# Patient Record
Sex: FEMALE | Race: WHITE | NOT HISPANIC OR LATINO | Employment: OTHER | URBAN - METROPOLITAN AREA
[De-identification: names, ages, dates, MRNs, and addresses within clinical notes are randomized per-mention and may not be internally consistent; named-entity substitution may affect disease eponyms.]

---

## 2017-02-03 ENCOUNTER — OFFICE VISIT (OUTPATIENT)
Dept: NEUROLOGY | Facility: CLINIC | Age: 62
End: 2017-02-03

## 2017-02-03 VITALS
SYSTOLIC BLOOD PRESSURE: 126 MMHG | HEIGHT: 61 IN | DIASTOLIC BLOOD PRESSURE: 80 MMHG | WEIGHT: 200 LBS | BODY MASS INDEX: 37.76 KG/M2

## 2017-02-03 DIAGNOSIS — G44.1 OTHER VASCULAR HEADACHE: Primary | ICD-10-CM

## 2017-02-03 PROCEDURE — 99213 OFFICE O/P EST LOW 20 MIN: CPT | Performed by: PSYCHIATRY & NEUROLOGY

## 2017-02-03 NOTE — PROGRESS NOTES
"Mary Jo Perry    Subjective     CC: headache    History of Present Illness     Mary Jo Perry returns to clinic today with a history of headache and insomnia. She has a long history of headaches which have responded previously to gabapentin. She has noted left frontal pain associated with photosensitivity. Previously she has noted headaches about 6-7 times per month, though these have now become infrequent.     However, she is now having significant symptoms of anxiety, associated with periods of tremor and shortness of breath.    An MRI of the brain on 11/4/10 showed only left subcortical gliosis noted also on prior studies.      The following portions of the patient's history were reviewed and updated as appropriate: allergies, current medications, past family history, past medical history, past social history, past surgical history and problem list.    Review of Systems   Constitutional: Negative.    Cardiovascular: Negative.        Objective     Visit Vitals   • /80   • Ht 61\" (154.9 cm)   • Wt 200 lb (90.7 kg)   • BMI 37.79 kg/m2        Neurologic Exam     Mental Status   Oriented to person, place, and time.   Registration: recalls 3 of 3 objects. Recall at 5 minutes: recalls 3 of 3 objects.   Attention: normal.   Speech: speech is normal   Level of consciousness: alert    Cranial Nerves   Cranial nerves II through XII intact.     Motor Exam   Muscle bulk: normal  Overall muscle tone: normal    Strength   Strength 5/5 throughout.     Sensory Exam   Light touch normal.     Gait, Coordination, and Reflexes     Coordination   Finger to nose coordination: normal        Assessment/Plan   Mary Jo was seen today for memory loss.    Diagnoses and all orders for this visit:    Other vascular headache        Mary Jo Perry returns to clinic today with a history of headache. Her headaches are not currently problematic and so I did not recommend any changes in her treatment regimen for this. She does " describe significant anxiety, and requested a prescription for Klonopin. This might be reasonable, but is outside of my area of expertise. I will defer this to her PCP. I did offer, however, to refer her to Behavioral Health, which was declined for now.      I spent 20 minutes with the patient. I   spent 70 percent of this time counseling and discussing diagnosis, evaluation and management.        Nikita Terrell MD

## 2017-02-06 RX ORDER — GABAPENTIN 400 MG/1
CAPSULE ORAL
Qty: 180 CAPSULE | Refills: 10 | Status: SHIPPED | OUTPATIENT
Start: 2017-02-06 | End: 2018-01-30 | Stop reason: SDUPTHER

## 2017-03-24 ENCOUNTER — CONSULT (OUTPATIENT)
Dept: CARDIOLOGY | Facility: CLINIC | Age: 62
End: 2017-03-24

## 2017-03-24 VITALS
HEART RATE: 74 BPM | BODY MASS INDEX: 38.53 KG/M2 | HEIGHT: 62 IN | DIASTOLIC BLOOD PRESSURE: 83 MMHG | SYSTOLIC BLOOD PRESSURE: 112 MMHG | WEIGHT: 209.4 LBS

## 2017-03-24 DIAGNOSIS — E78.5 DYSLIPIDEMIA: ICD-10-CM

## 2017-03-24 DIAGNOSIS — I10 ESSENTIAL HYPERTENSION: ICD-10-CM

## 2017-03-24 DIAGNOSIS — E66.9 OBESITY (BMI 35.0-39.9 WITHOUT COMORBIDITY): ICD-10-CM

## 2017-03-24 DIAGNOSIS — R07.2 PRECORDIAL PAIN: Primary | ICD-10-CM

## 2017-03-24 PROCEDURE — 99243 OFF/OP CNSLTJ NEW/EST LOW 30: CPT | Performed by: INTERNAL MEDICINE

## 2017-03-24 PROCEDURE — 93000 ELECTROCARDIOGRAM COMPLETE: CPT | Performed by: INTERNAL MEDICINE

## 2017-03-24 RX ORDER — ASPIRIN 325 MG
325 TABLET ORAL DAILY
COMMUNITY
End: 2019-10-09 | Stop reason: DRUGHIGH

## 2017-03-24 RX ORDER — CARVEDILOL 3.12 MG/1
3.12 TABLET ORAL 2 TIMES DAILY WITH MEALS
Qty: 60 TABLET | Refills: 6 | Status: SHIPPED | OUTPATIENT
Start: 2017-03-24 | End: 2018-02-09

## 2017-03-24 RX ORDER — NAPROXEN 500 MG/1
500 TABLET ORAL 2 TIMES DAILY WITH MEALS
Qty: 60 TABLET | Refills: 0 | Status: SHIPPED | OUTPATIENT
Start: 2017-03-24 | End: 2017-05-05 | Stop reason: SDUPTHER

## 2017-03-24 RX ORDER — LISINOPRIL 10 MG/1
10 TABLET ORAL DAILY
Qty: 30 TABLET | Refills: 6 | Status: SHIPPED | OUTPATIENT
Start: 2017-03-24 | End: 2018-02-09

## 2017-03-24 NOTE — PROGRESS NOTES
Mary Jo Perry is a 62 y.o. female.    Patient Active Problem List   Diagnosis   • Headache   • Insomnia        Chief Complaint: Chest Pain and Dizziness      PROBLEM LIST:  1. Chest pain  a. Echo 8/25/2016: EF 66%.  No valvular disease noted. Diastolic dysfunction.  2. Leg Pain  a. Lower extremity Doppler 8/25/16: No significant hemodynamic stenosis.  3. Vascular headaches  a. Being followed by neurology.  4. Hypertension  5. Hyperlipidemia  6. DM 2  7. Obesity  8. Surgical Hx:  a. Hysterectomy    History of Present Illness   Patient is a pleasant 62-year-old female of [place] descent with multiple cardiovascular risk factors.  She is referred for evaluation of chest pain.  History was obtained through the help of an . Her main concern is a midsternal chest pain which she describes as deep tightness.  She notes associated shortness of breath when she coughs and exerts.  The pain has been going on for about half a year now.  Denies leg swelling, sleep disturbances and syncope.  Does not smoke cigarettes nor does she consume alcohol.  Currently on disability.  Negative family history of cardiac disease.  She has also been treated for vascular headaches, lifestyle is sedentary and she does report significant shortness of breath and chest tightness on walking up the stairs or with other exertional activities.    The following portions of the patient's history were reviewed and updated as appropriate: allergies, current medications, past family history, past medical history, past social history, past surgical history and problem list.    Social history:  She is , has 6 children, denies cigarette smoking alcohol or substance abuse.  She is on disability.  Family History:  Negative for significant cardiac disease.    Allergies   Allergen Reactions   • Asa [Aspirin]    • Ibuprofen    • Penicillins          Current Outpatient Prescriptions:   •  aspirin 325 MG tablet, Take 325 mg by mouth Daily.,  "Disp: , Rfl:   •  estrogens, conjugated, (PREMARIN) 0.625 MG tablet, Take 1 tablet by mouth daily., Disp: , Rfl:   •  gabapentin (NEURONTIN) 400 MG capsule, TAKE TWO CAPSULES BY MOUTH THREE TIMES A DAY, Disp: 180 capsule, Rfl: 10  •  gabapentin (NEURONTIN) 800 MG tablet, Take 1 tablet by mouth 3 (three) times a day., Disp: , Rfl:   •  metFORMIN (GLUCOPHAGE) 1000 MG tablet, Take 1 tablet by mouth 2 (two) times a day. With meals, Disp: , Rfl:   •  traZODone (DESYREL) 100 MG tablet, Take 1 tablet by mouth every night., Disp: , Rfl:   •  carvedilol (COREG) 3.125 MG tablet, Take 1 tablet by mouth 2 (Two) Times a Day With Meals., Disp: 60 tablet, Rfl: 6  •  lisinopril (PRINIVIL,ZESTRIL) 10 MG tablet, Take 1 tablet by mouth Daily., Disp: 30 tablet, Rfl: 6  •  naproxen (EC-NAPROSYN) 500 MG EC tablet, Take 1 tablet by mouth 2 (Two) Times a Day With Meals., Disp: 60 tablet, Rfl: 0    Review of Systems   Constitution: Negative for chills, fever, weight gain and weight loss.   Cardiovascular: Positive for chest pain, dyspnea on exertion and palpitations. Negative for claudication, irregular heartbeat, leg swelling, orthopnea, paroxysmal nocturnal dyspnea and syncope.        No dizziness   Respiratory: Positive for shortness of breath.    Gastrointestinal: Negative for abdominal pain, constipation, diarrhea, nausea and vomiting.   Genitourinary:        No urinary symptoms   Neurological:        No symptoms of stroke.   All other systems reviewed and are negative.      Objective      Blood pressure 112/83, pulse 74, height 62\" (157.5 cm), weight 209 lb 6.4 oz (95 kg).    Physical Exam   Constitutional: She appears well-developed and well-nourished.   HENT:   HEENT exam unremarkable.   Neck: Neck supple. No JVD present.   No carotid bruits.   Cardiovascular: Normal rate, regular rhythm and normal heart sounds.    No murmur heard.  2 plus symmetric pulses.   Pulmonary/Chest: Breath sounds normal. She exhibits no tenderness. "   Abdominal:   Abdomen benign.   Musculoskeletal: She exhibits tenderness (Chest). She exhibits no edema.   Neurological:   Neurological exam unremarkable.   Vitals reviewed.        ECG 12 Lead  Date/Time: 3/24/2017 10:19 AM  Performed by: DEZ AMES  Authorized by: DEZ AMES   Comparison: not compared with previous ECG   Previous ECG: no previous ECG available  Rhythm: sinus rhythm  Clinical impression: normal ECG         labs from July 19, 2016 showed  normal CBC, blood sugar 186 rest of the CMP is normal.  Total cholesterol 187 triglycerides 185 HDL 53 LDL 97.  TSH 2.3.    Assessment:     Diagnosis Plan   1. Precordial pain  Stress Test With Pet Myocardial Perfusion   2. Essential hypertension     3. Dyslipidemia     4. Obesity (BMI 35.0-39.9 without comorbidity)        Plan:  1. There are 2 components to her chest pain, she apparently has some costochondritis with chest wall tenderness and also reports a deep tightness with dyspnea with or without exertion which appears to be an angina current in the setting of multiple cardiovascular risk factors.  2. For now we will discontinue amlodipine and start her on lisinopril 10 mg daily as she is diabetic.  3. Add Coreg 3.125 mg twice a day, continue aspirin and rest of the current medications.  4. Naprosyn 500 twice a day for 2-3 weeks for management of musculoskeletal component of chest pain.  5. Cardiac PET scan to evaluate for ischemic heart disease.  6. Diet and exercise recommended.  7. Management of risk factors specially diabetes and dyslipidemia per PCP.  8. Follow-up in one month.  Sooner when necessary.  9. Thank you for allowing us to participate in the care of your patient.    Dez Ames MD [facility]    Please note that portions of this note may have been completed with a voice recognition program. Efforts were made to edit the dictations, but occasionally words are mistranscribed.

## 2017-03-31 ENCOUNTER — APPOINTMENT (OUTPATIENT)
Dept: CARDIOLOGY | Facility: HOSPITAL | Age: 62
End: 2017-03-31
Attending: INTERNAL MEDICINE

## 2017-04-07 ENCOUNTER — HOSPITAL ENCOUNTER (OUTPATIENT)
Dept: CARDIOLOGY | Facility: HOSPITAL | Age: 62
Setting detail: HOSPITAL OUTPATIENT SURGERY
Discharge: HOME | End: 2017-04-07
Attending: INTERNAL MEDICINE | Admitting: INTERNAL MEDICINE

## 2017-04-07 VITALS — HEIGHT: 62 IN | BODY MASS INDEX: 38.46 KG/M2 | WEIGHT: 209 LBS

## 2017-04-07 DIAGNOSIS — R07.2 PRECORDIAL PAIN: ICD-10-CM

## 2017-04-07 PROCEDURE — 93018 CV STRESS TEST I&R ONLY: CPT | Performed by: INTERNAL MEDICINE

## 2017-04-07 PROCEDURE — 25010000002 REGADENOSON 0.4 MG/5ML SOLUTION: Performed by: INTERNAL MEDICINE

## 2017-04-07 PROCEDURE — 78492 MYOCRD IMG PET MLT RST&STRS: CPT | Performed by: INTERNAL MEDICINE

## 2017-04-07 PROCEDURE — A9555 RB82 RUBIDIUM: HCPCS | Performed by: INTERNAL MEDICINE

## 2017-04-07 PROCEDURE — 78492 MYOCRD IMG PET MLT RST&STRS: CPT

## 2017-04-07 PROCEDURE — 93017 CV STRESS TEST TRACING ONLY: CPT

## 2017-04-07 PROCEDURE — 0 RUBIDIUM CHLORIDE: Performed by: INTERNAL MEDICINE

## 2017-04-07 RX ADMIN — RUBIDIUM CHLORIDE RB-82 1 DOSE: 150 INJECTION, SOLUTION INTRAVENOUS at 13:25

## 2017-04-07 RX ADMIN — RUBIDIUM CHLORIDE RB-82 1 DOSE: 150 INJECTION, SOLUTION INTRAVENOUS at 13:15

## 2017-04-07 RX ADMIN — REGADENOSON 0.4 MG: 0.08 INJECTION, SOLUTION INTRAVENOUS at 13:29

## 2017-04-10 LAB
BH CV NUCLEAR PRIOR STUDY: 2
LV EF NUC BP: 71 %

## 2017-05-03 RX ORDER — NAPROXEN 500 MG/1
TABLET ORAL
Qty: 60 TABLET | Refills: 0 | Status: CANCELLED | OUTPATIENT
Start: 2017-05-03

## 2017-05-05 RX ORDER — NAPROXEN 500 MG/1
TABLET ORAL
Qty: 60 TABLET | Refills: 0 | Status: SHIPPED | OUTPATIENT
Start: 2017-05-05 | End: 2017-06-04 | Stop reason: SDUPTHER

## 2017-06-06 RX ORDER — NAPROXEN 500 MG/1
TABLET ORAL
Qty: 60 TABLET | Refills: 0 | Status: SHIPPED | OUTPATIENT
Start: 2017-06-06 | End: 2017-07-10 | Stop reason: SDUPTHER

## 2017-06-14 ENCOUNTER — TRANSCRIBE ORDERS (OUTPATIENT)
Dept: ADMINISTRATIVE | Facility: HOSPITAL | Age: 62
End: 2017-06-14

## 2017-06-14 DIAGNOSIS — Z12.31 VISIT FOR SCREENING MAMMOGRAM: Primary | ICD-10-CM

## 2017-07-06 ENCOUNTER — HOSPITAL ENCOUNTER (OUTPATIENT)
Dept: MAMMOGRAPHY | Facility: HOSPITAL | Age: 62
Discharge: HOME | End: 2017-07-06
Admitting: FAMILY MEDICINE

## 2017-07-06 ENCOUNTER — APPOINTMENT (OUTPATIENT)
Dept: MAMMOGRAPHY | Facility: HOSPITAL | Age: 62
End: 2017-07-06

## 2017-07-06 ENCOUNTER — APPOINTMENT (OUTPATIENT)
Dept: OTHER | Facility: HOSPITAL | Age: 62
End: 2017-07-06

## 2017-07-06 DIAGNOSIS — Z12.31 VISIT FOR SCREENING MAMMOGRAM: ICD-10-CM

## 2017-07-06 DIAGNOSIS — Z92.89 H/O MAMMOGRAM: ICD-10-CM

## 2017-07-06 PROCEDURE — 77063 BREAST TOMOSYNTHESIS BI: CPT

## 2017-07-06 PROCEDURE — 77063 BREAST TOMOSYNTHESIS BI: CPT | Performed by: RADIOLOGY

## 2017-07-06 PROCEDURE — 77067 SCR MAMMO BI INCL CAD: CPT | Performed by: RADIOLOGY

## 2017-07-06 PROCEDURE — G0202 SCR MAMMO BI INCL CAD: HCPCS

## 2017-07-10 RX ORDER — NAPROXEN 500 MG/1
TABLET ORAL
Qty: 180 TABLET | Refills: 1 | Status: SHIPPED | OUTPATIENT
Start: 2017-07-10 | End: 2018-02-09

## 2018-01-30 RX ORDER — GABAPENTIN 400 MG/1
CAPSULE ORAL
Qty: 180 CAPSULE | Refills: 4 | Status: SHIPPED | OUTPATIENT
Start: 2018-01-30 | End: 2018-03-04 | Stop reason: SDUPTHER

## 2018-02-09 ENCOUNTER — OFFICE VISIT (OUTPATIENT)
Dept: NEUROLOGY | Facility: CLINIC | Age: 63
End: 2018-02-09

## 2018-02-09 VITALS
DIASTOLIC BLOOD PRESSURE: 82 MMHG | SYSTOLIC BLOOD PRESSURE: 125 MMHG | HEIGHT: 62 IN | WEIGHT: 170 LBS | BODY MASS INDEX: 31.28 KG/M2

## 2018-02-09 DIAGNOSIS — R41.3 MEMORY LOSS: Primary | ICD-10-CM

## 2018-02-09 PROCEDURE — 99214 OFFICE O/P EST MOD 30 MIN: CPT | Performed by: PSYCHIATRY & NEUROLOGY

## 2018-02-09 RX ORDER — BUSPIRONE HYDROCHLORIDE 15 MG/1
TABLET ORAL
COMMUNITY
Start: 2018-02-02

## 2018-02-09 RX ORDER — ATORVASTATIN CALCIUM 80 MG/1
80 TABLET, FILM COATED ORAL DAILY
COMMUNITY

## 2018-02-09 RX ORDER — AMLODIPINE BESYLATE 5 MG/1
TABLET ORAL
COMMUNITY
Start: 2018-01-30

## 2018-02-09 NOTE — PROGRESS NOTES
"Mary Jo Perry    Subjective     CC: memory loss    History of Present Illness   Mary Jo Perry returns to clinic today for evaluation of memory impairment. She has noted symptoms since approximately October, 2017. She has noted times where she confused her daughters names. She also has periods when she feels \"cloudy headed\" and has had anxiety or panic attacks. It is also noted that she recently lost her  to cancer. She is currently residing at home independently, though her daughters check on her frequently.     She has a history of anxiety, though has not worked with behavioral health. She does follow with Dr. Stewart.. She was prescribed Ativan at [facility] ED in [facility], which she thinks has been helpful.     I have reviewed and confirmed the past family, social and medical history as accurate on 2/9/18.     Review of Systems   Constitutional: Negative.        Objective     /82  Ht 157.5 cm (62\")  Wt 77.1 kg (170 lb)  LMP  (LMP Unknown)  BMI 31.09 kg/m2     Neurologic Exam     Mental Status   Oriented to person, place, and time.   Registration: recalls 3 of 3 objects. Recall at 5 minutes: recalls 2 of 3 objects.   Attention: normal.   Speech: speech is normal   Level of consciousness: alert  Able to name object. Able to read. Able to repeat. Able to write. Normal comprehension.       MMSE=27      Assessment/Plan   Mary Jo was seen today for memory loss.    Diagnoses and all orders for this visit:    Memory loss  -     Ambulatory Referral to Behavioral Health  -     CBC & Differential; Future  -     Comprehensive Metabolic Panel  -     CT Head Without Contrast  -     Folate  -     Vitamin B12  -     TSH    Other orders  -     sertraline (ZOLOFT) 50 MG tablet; Take 2 tablets by mouth Daily.          Mary Jo Peryr returns to clinic today with a history of memory impairment. Her examination is largely reassuring, and I suspect a pseudodementia related to underlying anxiety and " depression, which I discussed. I will check screening bloodwork and a head CT to rule out other factors. She has agreed to referral to behavioral health as well, which may be most important. I was not comfortable refilling her Ativan, though this may well be a good medication for her. However, I will increase her sertraline to 100 mg daily while we await evaluation through behavioral health.      Nikita Terrell MD

## 2018-02-12 RX ORDER — SERTRALINE HYDROCHLORIDE 100 MG/1
100 TABLET, FILM COATED ORAL DAILY
Qty: 30 TABLET | Refills: 5 | Status: SHIPPED | OUTPATIENT
Start: 2018-02-12 | End: 2019-10-09

## 2018-02-15 ENCOUNTER — HOSPITAL ENCOUNTER (OUTPATIENT)
Dept: CT IMAGING | Facility: HOSPITAL | Age: 63
Discharge: HOME | End: 2018-02-15
Attending: PSYCHIATRY & NEUROLOGY | Admitting: PSYCHIATRY & NEUROLOGY

## 2018-02-15 PROCEDURE — 70450 CT HEAD/BRAIN W/O DYE: CPT

## 2018-03-06 RX ORDER — GABAPENTIN 400 MG/1
CAPSULE ORAL
Qty: 180 CAPSULE | Refills: 0 | Status: SHIPPED | OUTPATIENT
Start: 2018-03-06 | End: 2018-04-06 | Stop reason: SDUPTHER

## 2018-04-02 RX ORDER — CARVEDILOL 3.12 MG/1
TABLET ORAL
Qty: 60 TABLET | Refills: 5 | OUTPATIENT
Start: 2018-04-02

## 2018-04-06 RX ORDER — GABAPENTIN 400 MG/1
CAPSULE ORAL
Qty: 180 CAPSULE | Refills: 0 | Status: SHIPPED | OUTPATIENT
Start: 2018-04-06 | End: 2018-05-03 | Stop reason: SDUPTHER

## 2018-05-02 RX ORDER — GABAPENTIN 400 MG/1
CAPSULE ORAL
Qty: 180 CAPSULE | Refills: 0 | Status: CANCELLED | OUTPATIENT
Start: 2018-05-02

## 2018-05-03 RX ORDER — GABAPENTIN 400 MG/1
800 CAPSULE ORAL 3 TIMES DAILY
Qty: 180 CAPSULE | Refills: 0 | Status: SHIPPED | OUTPATIENT
Start: 2018-05-03 | End: 2018-06-08 | Stop reason: SDUPTHER

## 2018-06-08 RX ORDER — GABAPENTIN 400 MG/1
CAPSULE ORAL
Qty: 180 CAPSULE | Refills: 0 | Status: SHIPPED | OUTPATIENT
Start: 2018-06-08 | End: 2018-07-10 | Stop reason: SDUPTHER

## 2018-07-09 RX ORDER — GABAPENTIN 400 MG/1
CAPSULE ORAL
Qty: 180 CAPSULE | Refills: 0 | Status: CANCELLED | OUTPATIENT
Start: 2018-07-09

## 2018-07-10 RX ORDER — GABAPENTIN 400 MG/1
800 CAPSULE ORAL 3 TIMES DAILY
Qty: 180 CAPSULE | Refills: 0 | Status: SHIPPED | OUTPATIENT
Start: 2018-07-10 | End: 2018-09-05 | Stop reason: SDUPTHER

## 2018-09-07 RX ORDER — GABAPENTIN 400 MG/1
CAPSULE ORAL
Qty: 180 CAPSULE | Refills: 0 | Status: SHIPPED | OUTPATIENT
Start: 2018-09-07 | End: 2019-10-02 | Stop reason: SDUPTHER

## 2018-09-17 ENCOUNTER — TELEPHONE (OUTPATIENT)
Dept: NEUROLOGY | Facility: CLINIC | Age: 63
End: 2018-09-17

## 2018-09-17 NOTE — TELEPHONE ENCOUNTER
Daughter Yelitza [phone] called regarding patient's gabapentin.  She stated that the gabapentin is not covered by insurance.  If the insurance is not covering gabapentin, is there something else she can take.

## 2018-09-18 NOTE — TELEPHONE ENCOUNTER
Called [facility] and spoke with pharmacist, Kristine. Ran Rx through and denial was due to the amount of pills. Her insurance will not cover more than 4 tablets a day. With Toya Grady's permission, changed the dose from  mg (Take 2 tablets 3xs a day) to  mg( Take 1 tablet 3xs daily). Pharmacist ran this through and Medicaid will cover it!

## 2018-09-18 NOTE — TELEPHONE ENCOUNTER
LVM on Yelitza's phone letting her know everything is worked out with her mother's new prescription. Cb# given.

## 2018-09-18 NOTE — TELEPHONE ENCOUNTER
Spoke with pt's daughter, Yelitza. States they were thinking an alternative medication needed to be prescribed for her mother since the Insurance would not cover her Gabapentin. Informed her that we can actually do a prior auth to try and get it covered by her insurance, but will call the pharmacy first and see why it is being denied.

## 2019-10-02 ENCOUNTER — TELEPHONE (OUTPATIENT)
Dept: NEUROLOGY | Facility: CLINIC | Age: 64
End: 2019-10-02

## 2019-10-02 RX ORDER — GABAPENTIN 400 MG/1
800 CAPSULE ORAL 3 TIMES DAILY
Qty: 180 CAPSULE | Refills: 0 | Status: SHIPPED | OUTPATIENT
Start: 2019-10-02 | End: 2019-11-02 | Stop reason: SDUPTHER

## 2019-10-02 NOTE — TELEPHONE ENCOUNTER
----- Message from Glenda Rosario sent at 10/2/2019 10:10 AM EDT -----  Contact: [phone]  Dr. Terrell,    Pt called requesting a refill on her Rx for gabapentin (NEURONTIN) 400 MG capsule .

## 2019-10-02 NOTE — TELEPHONE ENCOUNTER
Called and informed pt that follow up appointment is needing to be scheduled to send in refill Rx. Pt stated understanding and transferred pt to scheduling and pt scheduled appt 10/8. Please send Rx. Thanks.

## 2019-10-02 NOTE — TELEPHONE ENCOUNTER
----- Message from Glenda Rosario sent at 10/2/2019 10:10 AM EDT -----  Contact: [phone]  Dr. Terrell,    Pt called requesting a refill on her Rx for gabapentin (NEURONTIN) 400 MG capsule .

## 2019-10-03 NOTE — TELEPHONE ENCOUNTER
Called pt informing that refill Rx has been sent into pharmacy and to call office if have any questions. Pt stated understanding. Thanks.

## 2019-10-09 ENCOUNTER — APPOINTMENT (OUTPATIENT)
Dept: LAB | Facility: HOSPITAL | Age: 64
End: 2019-10-09

## 2019-10-09 ENCOUNTER — OFFICE VISIT (OUTPATIENT)
Dept: NEUROLOGY | Facility: CLINIC | Age: 64
End: 2019-10-09

## 2019-10-09 VITALS
SYSTOLIC BLOOD PRESSURE: 118 MMHG | HEIGHT: 62 IN | WEIGHT: 170 LBS | BODY MASS INDEX: 31.28 KG/M2 | DIASTOLIC BLOOD PRESSURE: 72 MMHG

## 2019-10-09 DIAGNOSIS — R41.3 MEMORY LOSS: Primary | ICD-10-CM

## 2019-10-09 PROCEDURE — 85025 COMPLETE CBC W/AUTO DIFF WBC: CPT | Performed by: PHYSICIAN ASSISTANT

## 2019-10-09 PROCEDURE — 82607 VITAMIN B-12: CPT | Performed by: PHYSICIAN ASSISTANT

## 2019-10-09 PROCEDURE — 84443 ASSAY THYROID STIM HORMONE: CPT | Performed by: PHYSICIAN ASSISTANT

## 2019-10-09 PROCEDURE — 82746 ASSAY OF FOLIC ACID SERUM: CPT | Performed by: PHYSICIAN ASSISTANT

## 2019-10-09 PROCEDURE — 99215 OFFICE O/P EST HI 40 MIN: CPT | Performed by: PHYSICIAN ASSISTANT

## 2019-10-09 PROCEDURE — 36415 COLL VENOUS BLD VENIPUNCTURE: CPT | Performed by: PHYSICIAN ASSISTANT

## 2019-10-09 PROCEDURE — 80053 COMPREHEN METABOLIC PANEL: CPT | Performed by: PHYSICIAN ASSISTANT

## 2019-10-09 RX ORDER — SERTRALINE HYDROCHLORIDE 100 MG/1
150 TABLET, FILM COATED ORAL DAILY
Qty: 45 TABLET | Refills: 11 | Status: SHIPPED | OUTPATIENT
Start: 2019-10-09 | End: 2020-10-08

## 2019-10-09 RX ORDER — ASPIRIN 81 MG/1
TABLET, COATED ORAL
COMMUNITY
Start: 2019-09-26

## 2019-10-09 RX ORDER — METOPROLOL TARTRATE 100 MG/1
TABLET ORAL
COMMUNITY
Start: 2019-09-28 | End: 2020-11-02

## 2019-10-09 RX ORDER — IPRATROPIUM BROMIDE AND ALBUTEROL SULFATE 2.5; .5 MG/3ML; MG/3ML
SOLUTION RESPIRATORY (INHALATION)
COMMUNITY
Start: 2019-09-26 | End: 2020-11-02

## 2019-10-09 RX ORDER — MIRTAZAPINE 45 MG/1
TABLET, FILM COATED ORAL
COMMUNITY
Start: 2019-10-06

## 2019-10-09 RX ORDER — LIDOCAINE 50 MG/G
OINTMENT TOPICAL
COMMUNITY
Start: 2019-09-26

## 2019-10-09 RX ORDER — OMEPRAZOLE 20 MG/1
CAPSULE, DELAYED RELEASE ORAL
COMMUNITY
Start: 2019-09-11 | End: 2020-11-02

## 2019-10-09 NOTE — PROGRESS NOTES
"Subjective     Chief Complaint: memory loss      History of Present Illness   Mary Jo Perry is a 64 y.o. female who returns to clinic today for evaluation of memory impairment. She has noted symptoms since approximately October, 2017. She has noted times where she confused her daughters names. She also has periods when she feels \"cloudy headed\" and has had anxiety or panic attacks. It is also noted that she recently lost her  to cancer. She is currently residing at home independently, though her daughters check on her frequently.      She has a history of anxiety, though has not worked with behavioral health. She does follow with Dr. Stewart.. She was prescribed Ativan at [facility] ED in [facility], which she thinks has been helpful.     Today: Since her last visit in 218, she and her family feel that her memory has worsened. She continues to note significant anxiety.       I have reviewed and confirmed the past family, social and medical history as accurate on 10/9/19.     Review of Systems   Constitutional: Negative.    HENT: Negative.    Eyes: Negative.    Respiratory: Negative.    Cardiovascular: Negative.    Gastrointestinal: Negative.    Endocrine: Negative.    Genitourinary: Negative.    Musculoskeletal: Negative.    Skin: Negative.    Allergic/Immunologic: Negative.    Neurological:        Memory loss     Hematological: Negative.    Psychiatric/Behavioral: The patient is nervous/anxious.        Objective     /72   Ht 157.5 cm (62\")   Wt 77.1 kg (170 lb)   LMP  (LMP Unknown)   BMI 31.09 kg/m²     General appearance today is normal.     Physical Exam   Neurological: She has normal strength. She has a normal Finger-Nose-Finger Test.   Psychiatric: Her speech is normal.        Neurologic Exam     Mental Status   Oriented to person.   Oriented to place.   Oriented to time. Disoriented to date and day.   Registration: recalls 3 of 3 objects. Recall at 5 minutes: recalls 3 of 3 objects. Follows " 3 step commands.   Attention: 4/5 sequencing.   Speech: speech is normal   Level of consciousness: alert  Able to name object. Able to read. Able to repeat. Able to write. Normal comprehension.     Cranial Nerves   Cranial nerves II through XII intact.     Motor Exam   Muscle bulk: normal  Overall muscle tone: normal    Strength   Strength 5/5 throughout.     Sensory Exam   Light touch normal.     Gait, Coordination, and Reflexes     Coordination   Finger to nose coordination: normal    Tremor   Resting tremor: absent        Results  MMSE=26      Assessment/Plan   Mary Jo was seen today for memory loss.    Diagnoses and all orders for this visit:    Memory loss  -     Vitamin B12  -     TSH  -     Comprehensive Metabolic Panel  -     CBC & Differential  -     Folate  -     CBC Auto Differential    Other orders  -     sertraline (ZOLOFT) 100 MG tablet; Take 1.5 tablets by mouth Daily.          Discussion/Summary   Mary Jo Perry returns to clinic today with a history of memory impairment. Her examination remains largely reassuring, and we continue to suspect a pseudodementia related to underlying anxiety and depression, which was discussed in detail. It was elected to obtain screening bloodwork. I strongly encouraged her to establish care with Behavioral Health and have given her a list of providers within the area. After discussing potentially treatment options, it was elected to increase her sertraline to 150mg daily while we await evaluation through behavioral health. She will then follow up in 6 months , or sooner if needed.   I spent 40 minutes face to face with the patient and family with 25 minutes spent on discussing diagnosis, prognosis, diagnostic testing, evaluation, current status, treatment options and management as discussed above.       As part of this visit I reviewed radiology results, reviewed radiology images and obtained additional history from the family which is incorporated in the  HPI.      Toya Grady PA-C

## 2019-10-10 LAB
ALBUMIN SERPL-MCNC: 4.4 G/DL (ref 3.5–5.2)
ALBUMIN/GLOB SERPL: 1.5 G/DL
ALP SERPL-CCNC: 83 U/L (ref 39–117)
ALT SERPL W P-5'-P-CCNC: 22 U/L (ref 1–33)
ANION GAP SERPL CALCULATED.3IONS-SCNC: 13.7 MMOL/L (ref 5–15)
AST SERPL-CCNC: 24 U/L (ref 1–32)
BASOPHILS # BLD AUTO: 0.03 10*3/MM3 (ref 0–0.2)
BASOPHILS NFR BLD AUTO: 0.5 % (ref 0–1.5)
BILIRUB SERPL-MCNC: 0.2 MG/DL (ref 0.2–1.2)
BUN BLD-MCNC: 27 MG/DL (ref 8–23)
BUN/CREAT SERPL: 26.5 (ref 7–25)
CALCIUM SPEC-SCNC: 9.5 MG/DL (ref 8.6–10.5)
CHLORIDE SERPL-SCNC: 100 MMOL/L (ref 98–107)
CO2 SERPL-SCNC: 24.3 MMOL/L (ref 22–29)
CREAT BLD-MCNC: 1.02 MG/DL (ref 0.57–1)
DEPRECATED RDW RBC AUTO: 45.2 FL (ref 37–54)
EOSINOPHIL # BLD AUTO: 0.15 10*3/MM3 (ref 0–0.4)
EOSINOPHIL NFR BLD AUTO: 2.4 % (ref 0.3–6.2)
ERYTHROCYTE [DISTWIDTH] IN BLOOD BY AUTOMATED COUNT: 14.1 % (ref 12.3–15.4)
FOLATE SERPL-MCNC: 11.8 NG/ML (ref 4.78–24.2)
GFR SERPL CREATININE-BSD FRML MDRD: 55 ML/MIN/1.73
GLOBULIN UR ELPH-MCNC: 3 GM/DL
GLUCOSE BLD-MCNC: 176 MG/DL (ref 65–99)
HCT VFR BLD AUTO: 36 % (ref 34–46.6)
HGB BLD-MCNC: 11.4 G/DL (ref 12–15.9)
IMM GRANULOCYTES # BLD AUTO: 0.03 10*3/MM3 (ref 0–0.05)
IMM GRANULOCYTES NFR BLD AUTO: 0.5 % (ref 0–0.5)
LYMPHOCYTES # BLD AUTO: 2.19 10*3/MM3 (ref 0.7–3.1)
LYMPHOCYTES NFR BLD AUTO: 34.4 % (ref 19.6–45.3)
MCH RBC QN AUTO: 27.9 PG (ref 26.6–33)
MCHC RBC AUTO-ENTMCNC: 31.7 G/DL (ref 31.5–35.7)
MCV RBC AUTO: 88.2 FL (ref 79–97)
MONOCYTES # BLD AUTO: 0.4 10*3/MM3 (ref 0.1–0.9)
MONOCYTES NFR BLD AUTO: 6.3 % (ref 5–12)
NEUTROPHILS # BLD AUTO: 3.56 10*3/MM3 (ref 1.7–7)
NEUTROPHILS NFR BLD AUTO: 55.9 % (ref 42.7–76)
NRBC BLD AUTO-RTO: 0 /100 WBC (ref 0–0.2)
PLATELET # BLD AUTO: 229 10*3/MM3 (ref 140–450)
PMV BLD AUTO: 11 FL (ref 6–12)
POTASSIUM BLD-SCNC: 5.1 MMOL/L (ref 3.5–5.2)
PROT SERPL-MCNC: 7.4 G/DL (ref 6–8.5)
RBC # BLD AUTO: 4.08 10*6/MM3 (ref 3.77–5.28)
SODIUM BLD-SCNC: 138 MMOL/L (ref 136–145)
TSH SERPL DL<=0.05 MIU/L-ACNC: 1.96 UIU/ML (ref 0.27–4.2)
VIT B12 BLD-MCNC: 367 PG/ML (ref 211–946)
WBC NRBC COR # BLD: 6.36 10*3/MM3 (ref 3.4–10.8)

## 2019-11-04 ENCOUNTER — TELEPHONE (OUTPATIENT)
Dept: NEUROLOGY | Facility: CLINIC | Age: 64
End: 2019-11-04

## 2019-11-04 RX ORDER — GABAPENTIN 400 MG/1
CAPSULE ORAL
Qty: 180 CAPSULE | Refills: 0 | Status: SHIPPED | OUTPATIENT
Start: 2019-11-04 | End: 2019-12-04 | Stop reason: SDUPTHER

## 2019-11-04 NOTE — TELEPHONE ENCOUNTER
Called and spoke to pt informing that refill Rx has been sent into pharmacy. Pt stated verbal understanding. Thanks.

## 2019-12-05 ENCOUNTER — TELEPHONE (OUTPATIENT)
Dept: NEUROLOGY | Facility: CLINIC | Age: 64
End: 2019-12-05

## 2019-12-05 RX ORDER — GABAPENTIN 400 MG/1
CAPSULE ORAL
Qty: 180 CAPSULE | Refills: 0 | Status: SHIPPED | OUTPATIENT
Start: 2019-12-05 | End: 2020-01-20

## 2019-12-06 NOTE — TELEPHONE ENCOUNTER
Called and spoke to pt informing refill has been sent into pharmacy. Pt stated verbal understanding. Thanks.

## 2020-01-16 ENCOUNTER — TELEPHONE (OUTPATIENT)
Dept: NEUROLOGY | Facility: CLINIC | Age: 65
End: 2020-01-16

## 2020-01-16 NOTE — TELEPHONE ENCOUNTER
Called and left vm asking pt to please call office so that we may rescheduled fu appt. Awaiting call back. Thanks.

## 2020-01-18 DIAGNOSIS — G44.1 OTHER VASCULAR HEADACHE: Primary | ICD-10-CM

## 2020-01-20 RX ORDER — GABAPENTIN 400 MG/1
CAPSULE ORAL
Qty: 180 CAPSULE | Refills: 0 | Status: SHIPPED | OUTPATIENT
Start: 2020-01-20 | End: 2020-02-28

## 2020-02-28 DIAGNOSIS — G44.1 OTHER VASCULAR HEADACHE: ICD-10-CM

## 2020-02-28 RX ORDER — GABAPENTIN 400 MG/1
CAPSULE ORAL
Qty: 180 CAPSULE | Refills: 0 | Status: SHIPPED | OUTPATIENT
Start: 2020-02-28 | End: 2020-04-06

## 2020-04-04 DIAGNOSIS — G44.1 OTHER VASCULAR HEADACHE: ICD-10-CM

## 2020-04-06 RX ORDER — GABAPENTIN 400 MG/1
CAPSULE ORAL
Qty: 180 CAPSULE | Refills: 0 | Status: SHIPPED | OUTPATIENT
Start: 2020-04-06 | End: 2020-05-18

## 2020-05-17 DIAGNOSIS — G44.1 OTHER VASCULAR HEADACHE: ICD-10-CM

## 2020-05-18 RX ORDER — GABAPENTIN 400 MG/1
CAPSULE ORAL
Qty: 180 CAPSULE | Refills: 0 | Status: SHIPPED | OUTPATIENT
Start: 2020-05-18 | End: 2020-06-26

## 2020-06-25 DIAGNOSIS — G44.1 OTHER VASCULAR HEADACHE: ICD-10-CM

## 2020-06-26 RX ORDER — GABAPENTIN 400 MG/1
CAPSULE ORAL
Qty: 180 CAPSULE | Refills: 0 | Status: SHIPPED | OUTPATIENT
Start: 2020-06-26 | End: 2020-08-10

## 2020-08-07 DIAGNOSIS — G44.1 OTHER VASCULAR HEADACHE: ICD-10-CM

## 2020-08-07 NOTE — TELEPHONE ENCOUNTER
Called and spoke to pt daughter Nancie asking to schedule fu visit for pt to be re-evaluated to continue refilling medication. Nancie asked for Maced[place]  for pt. Thanks.

## 2020-08-10 ENCOUNTER — TELEPHONE (OUTPATIENT)
Dept: NEUROLOGY | Facility: CLINIC | Age: 65
End: 2020-08-10

## 2020-08-10 RX ORDER — GABAPENTIN 400 MG/1
CAPSULE ORAL
Qty: 180 CAPSULE | Refills: 0 | Status: SHIPPED | OUTPATIENT
Start: 2020-08-10 | End: 2020-10-15 | Stop reason: SDUPTHER

## 2020-08-10 NOTE — TELEPHONE ENCOUNTER
Called and spoke to pt daughter Yelitza concerning pt scheduled visit and informing that requested  is here to accommodate pt. Yelitza stated that she didn't know of visit and maybe it was her sister and she will call back to inform. Informed Yelitza if pt isn't able to make visit she can call back to reschedule. Yelitza stated verbal understanding. Thanks.

## 2020-09-15 DIAGNOSIS — G44.1 OTHER VASCULAR HEADACHE: ICD-10-CM

## 2020-09-15 RX ORDER — GABAPENTIN 400 MG/1
CAPSULE ORAL
Qty: 180 CAPSULE | Refills: 0 | OUTPATIENT
Start: 2020-09-15

## 2020-10-15 DIAGNOSIS — G44.1 OTHER VASCULAR HEADACHE: ICD-10-CM

## 2020-10-15 RX ORDER — GABAPENTIN 400 MG/1
800 CAPSULE ORAL 3 TIMES DAILY
Qty: 180 CAPSULE | Refills: 0 | Status: SHIPPED | OUTPATIENT
Start: 2020-10-15 | End: 2020-11-02 | Stop reason: SDUPTHER

## 2020-10-15 NOTE — TELEPHONE ENCOUNTER
Patient would like the medication refilled below:  Requested Prescriptions     Pending Prescriptions Disp Refills   • gabapentin (NEURONTIN) 400 MG capsule 180 capsule 0     Sig: Take 2 capsules by mouth 3 (Three) Times a Day.       Preferred pharmacy: [facility], [address], [phone]  Patient last seen: 10-09-19  Quantity left: PATIENT TOOK LAST DOSE LAST NIGHT  Best call back number: [phone], PATIENT'S SON ROZINA ( FOR PATIENT)

## 2020-11-02 ENCOUNTER — OFFICE VISIT (OUTPATIENT)
Dept: NEUROLOGY | Facility: CLINIC | Age: 65
End: 2020-11-02

## 2020-11-02 VITALS — TEMPERATURE: 96.6 F

## 2020-11-02 DIAGNOSIS — F41.9 ANXIETY: ICD-10-CM

## 2020-11-02 DIAGNOSIS — G44.1 OTHER VASCULAR HEADACHE: ICD-10-CM

## 2020-11-02 DIAGNOSIS — R41.3 MEMORY LOSS: Primary | ICD-10-CM

## 2020-11-02 PROCEDURE — 99214 OFFICE O/P EST MOD 30 MIN: CPT | Performed by: PHYSICIAN ASSISTANT

## 2020-11-02 RX ORDER — GABAPENTIN 400 MG/1
800 CAPSULE ORAL 3 TIMES DAILY
Qty: 180 CAPSULE | Refills: 5 | Status: SHIPPED | OUTPATIENT
Start: 2020-11-02 | End: 2020-11-02 | Stop reason: SDUPTHER

## 2020-11-02 RX ORDER — GABAPENTIN 400 MG/1
800 CAPSULE ORAL 3 TIMES DAILY
Qty: 180 CAPSULE | Refills: 5 | Status: SHIPPED | OUTPATIENT
Start: 2020-11-02

## 2020-11-02 NOTE — PROGRESS NOTES
"Subjective       Chief Complaint: memory loss      History of Present Illness   Mary Jo Perry is a 65 y.o. female who returns to clinic today for evaluation of memory impairment. She has noted symptoms since approximately October, 2017. She has noted times where she confused her daughters names. She also has periods when she feels \"cloudy headed\" and has had anxiety or panic attacks. It is also noted that she recently lost her  to cancer. She is currently residing at home independently, though her daughters check on her frequently.      She has a history of anxiety, though has not worked with behavioral health. She does follow with Dr. Stewart. She was prescribed Ativan at [facility] ED in [facility], which she thinks has been helpful.    Today: Since her last visit in 10/19, she feels essentially unchanged overall, though has noted fluctuations in her cognition from day to day. She continues to endorse significant anxiety. Her headaches are manageable with GBP.       I have reviewed and confirmed the past family, social and medical history as accurate on 11/2/2020.     Review of Systems   Constitutional: Negative.    Eyes: Negative.    Respiratory: Negative.    Cardiovascular: Negative.    Gastrointestinal: Negative.    Endocrine: Negative.    Genitourinary: Negative.    Musculoskeletal: Negative.    Skin: Negative.    Allergic/Immunologic: Negative.    Neurological: Positive for headaches.        Memory loss    Hematological: Negative.        Objective     Temp 96.6 °F (35.9 °C)   LMP  (LMP Unknown)     General appearance today is normal.       Physical Exam  Neurological:      Gait: Gait is intact.      Deep Tendon Reflexes: Strength normal.   Psychiatric:         Speech: Speech normal.          Neurologic Exam     Mental Status   Oriented to person.   Oriented to place.   Oriented to time. Disoriented to year.   Registration: recalls 3 of 3 objects. Recall at 5 minutes: recalls 3 of 3 objects. Follows " 3 step commands.   Attention: 4/5 sequencing.   Speech: speech is normal   Level of consciousness: alert  Able to name object. Unable to read. Unable to repeat. Able to write. Normal comprehension.     Cranial Nerves   Cranial nerves II through XII intact.     Motor Exam   Muscle bulk: normal  Overall muscle tone: normal    Strength   Strength 5/5 throughout.     Gait, Coordination, and Reflexes     Gait  Gait: normal    Tremor   Resting tremor: absent        Results  MMSE=23 (limited by language barrier)       Assessment/Plan   Diagnoses and all orders for this visit:    1. Memory loss (Primary)    2. Other vascular headache  -     Discontinue: gabapentin (NEURONTIN) 400 MG capsule; Take 2 capsules by mouth 3 (Three) Times a Day.  Dispense: 180 capsule; Refill: 5  -     gabapentin (NEURONTIN) 400 MG capsule; Take 2 capsules by mouth 3 (Three) Times a Day.  Dispense: 180 capsule; Refill: 5          Discussion/Summary   Mary Jo Perry returns to clinic today for evaluation of memory loss and headaches. Her examination remains largely reassuring, and we continue to suspect a pseudodementia related to underlying anxiety and depression, which was discussed in detail. I again reviewed her current status and treatment options. After discussing potential treatment options, it was elected to continue on GBP unchanged for her headaches. I have also made a referral to Behavioral Healh. She will then follow up in 6 months , or sooner if needed.   I spent 25 minutes face to face with the patient and  with 15 minutes spent on discussing diagnosis, prognosis, evaluation, current status, treatment options and management as discussed above.       As part of this visit I discussed the history with the patient .      Toya Grady PA-C